# Patient Record
Sex: FEMALE | Employment: UNEMPLOYED | URBAN - METROPOLITAN AREA
[De-identification: names, ages, dates, MRNs, and addresses within clinical notes are randomized per-mention and may not be internally consistent; named-entity substitution may affect disease eponyms.]

---

## 2023-11-22 ENCOUNTER — HOSPITAL ENCOUNTER (EMERGENCY)
Facility: HOSPITAL | Age: 48
Discharge: HOME | End: 2023-11-22
Attending: EMERGENCY MEDICINE
Payer: MEDICAID

## 2023-11-22 VITALS
WEIGHT: 145 LBS | SYSTOLIC BLOOD PRESSURE: 121 MMHG | OXYGEN SATURATION: 99 % | BODY MASS INDEX: 25.69 KG/M2 | RESPIRATION RATE: 20 BRPM | HEART RATE: 74 BPM | TEMPERATURE: 98 F | HEIGHT: 63 IN | DIASTOLIC BLOOD PRESSURE: 70 MMHG

## 2023-11-22 DIAGNOSIS — R10.9 RIGHT FLANK PAIN: Primary | ICD-10-CM

## 2023-11-22 LAB
ALBUMIN SERPL-MCNC: 3.9 G/DL (ref 3.3–5.5)
ALP SERPL-CCNC: 44 U/L (ref 42–141)
B-HCG UR QL: NEGATIVE
BILIRUB SERPL-MCNC: 0.7 MG/DL (ref 0.2–1.6)
BILIRUBIN, POC UA: NEGATIVE
BLOOD, POC UA: NEGATIVE
BUN SERPL-MCNC: 8 MG/DL (ref 7–22)
CALCIUM SERPL-MCNC: 9.4 MG/DL (ref 8–10.3)
CHLORIDE SERPL-SCNC: 106 MMOL/L (ref 98–108)
CLARITY, POC UA: CLEAR
COLOR, POC UA: YELLOW
CREAT SERPL-MCNC: 0.8 MG/DL (ref 0.6–1.2)
CTP QC/QA: YES
GLUCOSE SERPL-MCNC: 101 MG/DL (ref 73–118)
GLUCOSE, POC UA: NEGATIVE
HCT, POC: NORMAL
HGB, POC: NORMAL (ref 14–18)
KETONES, POC UA: NEGATIVE
LEUKOCYTE EST, POC UA: NEGATIVE
MCH, POC: NORMAL
MCHC, POC: NORMAL
MCV, POC: NORMAL
MPV, POC: NORMAL
NITRITE, POC UA: NEGATIVE
PH UR STRIP: 7 [PH]
POC ALT (SGPT): 13 U/L (ref 10–47)
POC AST (SGOT): 21 U/L (ref 11–38)
POC PLATELET COUNT: NORMAL
POC TCO2: 29 MMOL/L (ref 18–33)
POTASSIUM BLD-SCNC: 4.1 MMOL/L (ref 3.6–5.1)
PROTEIN, POC UA: NEGATIVE
PROTEIN, POC: 7.1 G/DL (ref 6.4–8.1)
RBC, POC: NORMAL
RDW, POC: NORMAL
SODIUM BLD-SCNC: 141 MMOL/L (ref 128–145)
SPECIFIC GRAVITY, POC UA: 1.02
UROBILINOGEN, POC UA: 0.2 E.U./DL
WBC, POC: NORMAL

## 2023-11-22 PROCEDURE — 80053 COMPREHEN METABOLIC PANEL: CPT | Mod: ER

## 2023-11-22 PROCEDURE — 81003 URINALYSIS AUTO W/O SCOPE: CPT | Mod: ER

## 2023-11-22 PROCEDURE — 96374 THER/PROPH/DIAG INJ IV PUSH: CPT | Mod: ER

## 2023-11-22 PROCEDURE — 25000003 PHARM REV CODE 250: Mod: ER | Performed by: EMERGENCY MEDICINE

## 2023-11-22 PROCEDURE — 63600175 PHARM REV CODE 636 W HCPCS: Mod: ER | Performed by: EMERGENCY MEDICINE

## 2023-11-22 PROCEDURE — 85025 COMPLETE CBC W/AUTO DIFF WBC: CPT | Mod: ER

## 2023-11-22 PROCEDURE — 81025 URINE PREGNANCY TEST: CPT | Mod: ER

## 2023-11-22 PROCEDURE — 99285 EMERGENCY DEPT VISIT HI MDM: CPT | Mod: 25,ER

## 2023-11-22 RX ORDER — KETOROLAC TROMETHAMINE 10 MG/1
TABLET, FILM COATED ORAL
Qty: 15 TABLET | Refills: 0 | Status: SHIPPED | OUTPATIENT
Start: 2023-11-22

## 2023-11-22 RX ORDER — KETOROLAC TROMETHAMINE 30 MG/ML
15 INJECTION, SOLUTION INTRAMUSCULAR; INTRAVENOUS
Status: COMPLETED | OUTPATIENT
Start: 2023-11-22 | End: 2023-11-22

## 2023-11-22 RX ADMIN — KETOROLAC TROMETHAMINE 15 MG: 30 INJECTION, SOLUTION INTRAMUSCULAR at 11:11

## 2023-11-22 RX ADMIN — SODIUM CHLORIDE 1000 ML: 9 INJECTION, SOLUTION INTRAVENOUS at 11:11

## 2023-11-22 NOTE — DISCHARGE INSTRUCTIONS
The cause of your pain can be from a recently passed kidney stone or from an ovarian follicle that is bleeding a little. Take the prescribed pain medicine. If yo develop worsening pain that is intolerable, return to the ER.

## 2023-11-22 NOTE — ED PROVIDER NOTES
Encounter Date: 11/22/2023    SCRIBE #1 NOTE: I, Lia Meyers, am scribing for, and in the presence of,  Gia Baker MD. I have scribed the following portions of the note - Other sections scribed: HPI, ROS, PE.       History     Chief Complaint   Patient presents with    Flank Pain     Right lower flank/back pain, onset yesterday, denies injury/fall      Sharon Shafer is a 49 y.o. female who presents to the ED with worsening right sided flank pain that began 2 days ago. Pain is in the right flank and RLQ. No associated symptoms. Patient reports taking Tylenol with minimal relief. Patient reports associated urinary frequency and intermittent nausea. Patient denies injury or falling. Patient denies any associated vomiting, fever, appetite change, or hematuria. Patient denies history of kidney stones.     The history is provided by the patient. No  was used.     Review of patient's allergies indicates:  No Known Allergies  History reviewed. No pertinent past medical history.  History reviewed. No pertinent surgical history.  History reviewed. No pertinent family history.     Review of Systems   Constitutional:  Negative for activity change, appetite change, chills and fever.   HENT:  Negative for congestion, rhinorrhea, sneezing and sore throat.    Respiratory:  Negative for cough, choking, shortness of breath and wheezing.    Cardiovascular:  Negative for chest pain and palpitations.   Gastrointestinal:  Positive for abdominal pain and nausea. Negative for constipation, diarrhea and vomiting.   Genitourinary:  Positive for flank pain (right) and frequency. Negative for dysuria and hematuria.   Neurological:  Negative for dizziness, syncope, light-headedness and headaches.   All other systems reviewed and are negative.      Physical Exam     Initial Vitals [11/22/23 1029]   BP Pulse Resp Temp SpO2   113/73 80 19 98.3 °F (36.8 °C) 100 %      MAP       --         Physical Exam    Nursing note  and vitals reviewed.  Constitutional: Vital signs are normal. She appears well-developed and well-nourished. She is cooperative. She does not appear ill. No distress.   HENT:   Head: Normocephalic and atraumatic.   Mouth/Throat: Uvula is midline and mucous membranes are normal.   Eyes: Conjunctivae and lids are normal.   Neck: Neck supple.   Normal range of motion.  Cardiovascular:  Normal rate, regular rhythm, S1 normal, S2 normal and normal heart sounds.           No murmur heard.  Pulmonary/Chest: Effort normal and breath sounds normal. No respiratory distress. She has no wheezes.   Abdominal: Abdomen is soft. Bowel sounds are normal. She exhibits no distension and no mass. There is abdominal tenderness in the right lower quadrant and suprapubic area.   No right CVA tenderness.  No left CVA tenderness. There is no rebound and no guarding.   Musculoskeletal:         General: No tenderness or edema.      Cervical back: Normal range of motion and neck supple.     Neurological: She is alert and oriented to person, place, and time.   Skin: Skin is warm, dry and intact.   Psychiatric: She has a normal mood and affect. Her speech is normal and behavior is normal.         ED Course   Procedures  Labs Reviewed   POCT URINE PREGNANCY   POCT URINALYSIS W/O SCOPE   POCT CBC   POCT URINALYSIS(INSTRUMENT)   POCT CMP   POCT CMP          Imaging Results               CT Renal Stone Study ABD Pelvis WO (Final result)  Result time 11/22/23 12:52:31      Final result by Sylvester Alamo MD (11/22/23 12:52:31)                   Impression:      This report was flagged in Epic as abnormal.    1. There is mild prominence of the right renal collecting systems.  This findings may reflect sequela of recently passed calculus or infection, correlation with urinalysis recommended.  2. Two punctate calcifications and along the potential course of the distal left ureter, no definite nephrolithiasis however correlation is advised.  No  secondary findings of left obstructive uropathy.  3. Suspected small hemorrhagic follicle within the right ovary.  If there is discrete pain in the region, ultrasound could be performed for further evaluation as warranted.  4. Possible uterine leiomyoma.  Nonemergent ultrasound could be performed as warranted.  5. Please see above for additional findings.      Electronically signed by: Sylvester Alamo MD  Date:    11/22/2023  Time:    12:52               Narrative:    EXAMINATION:  CT RENAL STONE STUDY ABD PELVIS WO    CLINICAL HISTORY:  right flank and RLQ pain;    TECHNIQUE:  Low dose axial images, sagittal and coronal reformations were obtained from the lung bases to the pubic symphysis.  Contrast was not administered.    COMPARISON:  None    FINDINGS:  Images of the lower thorax are remarkable for a 4 mm pulmonary nodule within the posterior aspect of the right lower lobe.    Allowing for motion artifact, a liver, spleen and air, pancreas, gallbladder and adrenal glands have a grossly unremarkable noncontrast appearance.  In there is no biliary dilation or ascites.  There is a duodenal diverticulum without inflammation.  The stomach is decompressed without wall thickening.    The left kidney has a grossly unremarkable noncontrast appearance without hydronephrosis or nephrolithiasis.  The left ureter is grossly unremarkable however cannot be followed in its entirety to the urinary bladder.  Several phleboliths are noted along the course of the distal left ureter.  There is mild prominence of the right renal collecting system.  No right nephrolithiasis.  The right ureter is unable to be followed in its entirety to the urinary bladder.  No definite calculi seen.  The urinary bladder is decompressed without wall thickening.  The uterus has a lobular contour, may reflect underlying leiomyoma.  There is trace fluid in the pelvis.  The left adnexa is unremarkable.  There may be a hemorrhagic cyst within the right ovary  measuring 1.6 cm.    There are a few scattered colonic diverticula without inflammation.  The terminal ileum and appendix are unremarkable.  The small bowel is grossly unremarkable.  No focal organized pelvic fluid collection.    There are degenerative changes of the spine.  No significant inguinal lymphadenopathy.  There are surgical changes of the anterior abdominal wall.                                       Medications   sodium chloride 0.9% bolus 1,000 mL 1,000 mL (1,000 mLs Intravenous New Bag 11/22/23 1113)   ketorolac injection 15 mg (15 mg Intravenous Given 11/22/23 1113)     Medical Decision Making  Sharon Shafer is a 49 y.o. female who presents to the ED with worsening right sided flank pain that began 2 days ago. Pain is in the right flank and RLQ. No associated symptoms. Patient reports taking Tylenol with minimal relief. Patient reports associated urinary frequency and intermittent nausea. Patient denies injury or falling. Patient denies any associated vomiting, fever, appetite change, or hematuria. Patient denies history of kidney stones.     On exam, she does not have fever or CVA tenderness. She has pain in RLQ. In shared decision making with pt, will order labs, CT and pain medicine. I considered but excluded fever, sepsis, bowel obstruction, intestinal perforation, acute appendicitis, pyelonephritis. CT suggests recently passed stone and hemorrhagic right follicle. On re-exam, pain is greatly improved and there are no peritoneal signs. I will prescribe  toradol for pain at home.      Amount and/or Complexity of Data Reviewed  Labs: ordered.  Radiology: ordered.    Risk  Prescription drug management.            Scribe Attestation:   Scribe #1: I performed the above scribed service and the documentation accurately describes the services I performed. I attest to the accuracy of the note.                        I, Dr. Gia Baker, personally performed the services described in this documentation.   All  medical record entries made by the scribe were at my direction and in my presence.   I have reviewed the chart and agree that the record is accurate and complete.   Gia Baker MD.  11:02 AM 11/22/2023     Clinical Impression:  Final diagnoses:  [R10.9] Right flank pain (Primary)          ED Disposition Condition    Discharge Stable          ED Prescriptions       Medication Sig Dispense Start Date End Date Auth. Provider    ketorolac (TORADOL) 10 mg tablet Take 1 tablet after breakfast, lunch and dinner (on full stomach), as needed for pain 15 tablet 11/22/2023 -- Gia Baker MD          Follow-up Information    None          Gia Baker MD  11/22/23 7935

## 2025-01-07 ENCOUNTER — HOSPITAL ENCOUNTER (EMERGENCY)
Facility: HOSPITAL | Age: 50
Discharge: HOME | End: 2025-01-07
Attending: STUDENT IN AN ORGANIZED HEALTH CARE EDUCATION/TRAINING PROGRAM
Payer: MEDICAID

## 2025-01-07 VITALS
SYSTOLIC BLOOD PRESSURE: 127 MMHG | HEART RATE: 98 BPM | BODY MASS INDEX: 24.99 KG/M2 | RESPIRATION RATE: 20 BRPM | DIASTOLIC BLOOD PRESSURE: 71 MMHG | HEIGHT: 65 IN | OXYGEN SATURATION: 100 % | WEIGHT: 150 LBS | TEMPERATURE: 99 F

## 2025-01-07 DIAGNOSIS — R07.9 CHEST PAIN: ICD-10-CM
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
## 2025-01-07 PROCEDURE — 25000003 PHARM REV CODE 250: Performed by: STUDENT IN AN ORGANIZED HEALTH CARE EDUCATION/TRAINING PROGRAM

## 2025-01-07 PROCEDURE — 99283 EMERGENCY DEPT VISIT LOW MDM: CPT | Mod: 25

## 2025-01-07 RX ORDER — ACETAMINOPHEN 500 MG
1000 TABLET ORAL
Status: COMPLETED | OUTPATIENT
Start: 2025-01-07 | End: 2025-01-07

## 2025-01-07 RX ORDER — IBUPROFEN 600 MG/1
600 TABLET ORAL
Status: DISCONTINUED | OUTPATIENT
Start: 2025-01-07 | End: 2025-01-08 | Stop reason: HOSPADM

## 2025-01-07 RX ORDER — OXYCODONE AND ACETAMINOPHEN 5; 325 MG/1; MG/1
1 TABLET ORAL
Status: COMPLETED | OUTPATIENT
Start: 2025-01-07 | End: 2025-01-07

## 2025-01-07 RX ORDER — METHOCARBAMOL 500 MG/1
1000 TABLET, FILM COATED ORAL 3 TIMES DAILY
Qty: 30 TABLET | Refills: 0 | Status: SHIPPED | OUTPATIENT
Start: 2025-01-07 | End: 2025-01-12

## 2025-01-07 RX ADMIN — ACETAMINOPHEN 1000 MG: 500 TABLET, FILM COATED ORAL at 08:01

## 2025-01-07 RX ADMIN — OXYCODONE HYDROCHLORIDE AND ACETAMINOPHEN 1 TABLET: 5; 325 TABLET ORAL at 09:01
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
## 2025-01-08 NOTE — DISCHARGE INSTRUCTIONS

## 2025-04-27 ENCOUNTER — HOSPITAL ENCOUNTER (EMERGENCY)
Facility: HOSPITAL | Age: 50
Discharge: HOME | End: 2025-04-27
Attending: EMERGENCY MEDICINE
Payer: MEDICAID

## 2025-04-27 VITALS
WEIGHT: 150 LBS | HEART RATE: 60 BPM | RESPIRATION RATE: 20 BRPM | BODY MASS INDEX: 24.99 KG/M2 | SYSTOLIC BLOOD PRESSURE: 105 MMHG | DIASTOLIC BLOOD PRESSURE: 74 MMHG | TEMPERATURE: 99 F | OXYGEN SATURATION: 99 % | HEIGHT: 65 IN

## 2025-04-27 DIAGNOSIS — M25.561 ACUTE PAIN OF RIGHT KNEE: Primary | ICD-10-CM

## 2025-04-27 DIAGNOSIS — M25.571 ACUTE RIGHT ANKLE PAIN: ICD-10-CM

## 2025-04-27 LAB
BILIRUBIN, POC UA: NEGATIVE
BLOOD, POC UA: NEGATIVE
CLARITY, UA: CLEAR
COLOR, UA: YELLOW
GLUCOSE, POC UA: NEGATIVE
KETONES, POC UA: NEGATIVE
LEUKOCYTE EST, POC UA: NEGATIVE
NITRITE, POC UA: NEGATIVE
PH UR STRIP: 8.5 [PH] (ref 5–8)
PROTEIN, POC UA: NEGATIVE
SPECIFIC GRAVITY, POC UA: 1.01 (ref 1–1.03)
UROBILINOGEN, POC UA: 0.2 E.U./DL

## 2025-04-27 PROCEDURE — 25000003 PHARM REV CODE 250: Mod: ER | Performed by: EMERGENCY MEDICINE

## 2025-04-27 PROCEDURE — 99283 EMERGENCY DEPT VISIT LOW MDM: CPT | Mod: ER

## 2025-04-27 RX ORDER — IBUPROFEN 600 MG/1
600 TABLET ORAL EVERY 6 HOURS PRN
Qty: 50 TABLET | Refills: 0 | Status: SHIPPED | OUTPATIENT
Start: 2025-04-27

## 2025-04-27 RX ORDER — IBUPROFEN 600 MG/1
600 TABLET ORAL
Status: COMPLETED | OUTPATIENT
Start: 2025-04-27 | End: 2025-04-27

## 2025-04-27 RX ADMIN — IBUPROFEN 600 MG: 600 TABLET ORAL at 03:04

## 2025-04-27 NOTE — ED PROVIDER NOTES
Encounter Date: 4/27/2025    SCRIBE #1 NOTE: I, Rosmery Veronica, am scribing for, and in the presence of,  Rufus Hamlin MD. I have scribed the following portions of the note - Other sections scribed: HPI, ROS, PE.       History     Chief Complaint   Patient presents with    Leg Pain     RIGHT LEG PAIN x 1 WEEK   DENIES INJURY      Sharon Shafer is a 49 y.o. female, with no pertinent PMHx, who presents to the ED with R knee pain x1 1/2 weeks. Patient reports pain was initially contained to her knee but now reports R ankle pain and R heel pain. Reports pain with bearing weight and with certain movements. Reports a fair amount of daily walking while mostly wearing tennis shoes. Denies any recent falls or injuries. Denies heavy lifting. Reports use of tylenol to alleviate pain. No other exacerbating or alleviating factors. Denies numbness or other associated symptoms.  Denies cardiovascular, kidney, or abdominal issues. Reports she does have a general PCP.     The history is provided by the patient. No  was used.     Review of patient's allergies indicates:  No Known Allergies  No past medical history on file.  No past surgical history on file.  No family history on file.  Social History[1]  Review of Systems   Constitutional:  Negative for fever.   Respiratory:  Negative for shortness of breath.    Cardiovascular:  Negative for chest pain.   Gastrointestinal:  Negative for abdominal pain and vomiting.   Musculoskeletal:  Positive for arthralgias and myalgias.   Skin:  Negative for rash.   Neurological:  Negative for numbness.       Physical Exam     Initial Vitals [04/27/25 1431]   BP Pulse Resp Temp SpO2   105/74 60 20 98.7 °F (37.1 °C) 99 %      MAP       --         Physical Exam    Nursing note and vitals reviewed.  Constitutional: She appears well-developed and well-nourished. She does not appear ill. No distress.   HENT:   Head: Normocephalic.   Eyes: EOM are normal.   Neck:   Normal range of  motion.  Cardiovascular:  Normal rate, regular rhythm and normal heart sounds.           No murmur heard.  Pulses:       Dorsalis pedis pulses are 2+ on the right side.   Pulmonary/Chest: Breath sounds normal. No respiratory distress. She has no wheezes.   Abdominal: Abdomen is soft. There is no abdominal tenderness.   Musculoskeletal:         General: No edema.      Cervical back: Normal range of motion.      Comments: No R patella tenderness. No R anterior patella tenderness. Tenderness of medical aspect of R knee. No overlying erythema. No induration. No edema. Able to range R knee. Tenderness over medial malleolus.      Neurological: She is alert.   Skin: Skin is warm.         ED Course   Procedures  Labs Reviewed   POCT URINALYSIS W/O SCOPE       Result Value    Glucose, UA Negative      Bilirubin, UA Negative      Ketones, UA Negative      Spec Grav UA 1.015      Blood, UA Negative      PH, UA 8.5      Protein, UA Negative      Urobilinogen, UA 0.2      Nitrite, UA Negative      Leukocytes, UA Negative      Color, UA POC Yellow      Clarity, UA, POC Clear            Imaging Results    None          Medications   ibuprofen tablet 600 mg (600 mg Oral Given 4/27/25 0043)     Medical Decision Making  49-year-old female presenting with right knee pain and right ankle pain.  Atraumatic pain.  No overlying skin changes noted to suggest cellulitis.  The area is not warm to touch and nonedematous.  The patient has full mobility of the right knee.  The patient is able to tolerate bearing weight.  No gait instability.  Suspected osteoarthritis.  Discussed no heavy lifting for the next few days until symptoms improve.  Discussed ibuprofen and Tylenol as needed for pain control.  Discussed following up with primary care for further evaluation.        Medical Decision Making:     A. Problem List:  1. Right knee pain  2. Right ankle pain     B. Differential diagnosis:    Osteoarthritis, muscle strain    Part of the note was  done using electronic dictation services.         Amount and/or Complexity of Data Reviewed  Labs: ordered. Decision-making details documented in ED Course.    Risk  Prescription drug management.            Scribe Attestation:   Scribe #1: I performed the above scribed service and the documentation accurately describes the services I performed. I attest to the accuracy of the note.        ED Course as of 04/27/25 2023   Sun Apr 27, 2025   1533 POCT URINALYSIS W/O SCOPE  UA ordered by triage.    [JM]      ED Course User Index  [JM] Rufus Hamlin MD                       I, Rufus Hamlin, personally performed the services described in this documentation. All medical record entries made by the scribe were at my direction and in my presence. I have reviewed the chart and agree that the record reflects my personal performance and is accurate and complete.      DISCLAIMER: This note was prepared with [employer] voice recognition transcription software. Garbled syntax, mangled pronouns, and other bizarre constructions may be attributed to that software system.     Clinical Impression:  Final diagnoses:  [M25.561] Acute pain of right knee (Primary)  [M25.571] Acute right ankle pain          ED Disposition Condition    Discharge Stable          ED Prescriptions       Medication Sig Dispense Start Date End Date Auth. Provider    ibuprofen (ADVIL,MOTRIN) 600 MG tablet Take 1 tablet (600 mg total) by mouth every 6 (six) hours as needed for Pain. 50 tablet 4/27/2025 -- Rufus Hamlin MD          Follow-up Information       Follow up With Specialties Details Why Contact Info    [facility]  Schedule an appointment as soon as possible for a visit in 1 week Primary care [address] [place] [id number] [phone]    [facility] ED Emergency Medicine  If symptoms worsen [address] [facility] [address] [phone]               [1]         Rufus Hamlin,  MD  04/27/25 2023

## 2025-04-27 NOTE — DISCHARGE INSTRUCTIONS
Please schedule follow up appointment with your primary care provider for re-evaluation within the next 1 week.    Recommend ibuprofen as needed for pain control.  Recommend weight-bearing as tolerated.  Avoid any heavy lifting or movements that may worsen the pain.